# Patient Record
Sex: MALE | Race: WHITE
[De-identification: names, ages, dates, MRNs, and addresses within clinical notes are randomized per-mention and may not be internally consistent; named-entity substitution may affect disease eponyms.]

---

## 2019-08-16 ENCOUNTER — HOSPITAL ENCOUNTER (EMERGENCY)
Dept: HOSPITAL 62 - ER | Age: 54
Discharge: HOME | End: 2019-08-16
Payer: COMMERCIAL

## 2019-08-16 VITALS — DIASTOLIC BLOOD PRESSURE: 85 MMHG | SYSTOLIC BLOOD PRESSURE: 114 MMHG

## 2019-08-16 DIAGNOSIS — R61: ICD-10-CM

## 2019-08-16 DIAGNOSIS — R10.13: ICD-10-CM

## 2019-08-16 DIAGNOSIS — R07.89: Primary | ICD-10-CM

## 2019-08-16 DIAGNOSIS — F17.210: ICD-10-CM

## 2019-08-16 DIAGNOSIS — E78.00: ICD-10-CM

## 2019-08-16 DIAGNOSIS — R11.0: ICD-10-CM

## 2019-08-16 DIAGNOSIS — E78.5: ICD-10-CM

## 2019-08-16 LAB
ADD MANUAL DIFF: NO
ALBUMIN SERPL-MCNC: 4.9 G/DL (ref 3.5–5)
ALP SERPL-CCNC: 51 U/L (ref 38–126)
ANION GAP SERPL CALC-SCNC: 12 MMOL/L (ref 5–19)
AST SERPL-CCNC: 26 U/L (ref 17–59)
BASOPHILS # BLD AUTO: 0 10^3/UL (ref 0–0.2)
BASOPHILS NFR BLD AUTO: 0.3 % (ref 0–2)
BILIRUB DIRECT SERPL-MCNC: 0.2 MG/DL (ref 0–0.4)
BILIRUB SERPL-MCNC: 1.4 MG/DL (ref 0.2–1.3)
BUN SERPL-MCNC: 13 MG/DL (ref 7–20)
CALCIUM: 10 MG/DL (ref 8.4–10.2)
CHLORIDE SERPL-SCNC: 104 MMOL/L (ref 98–107)
CO2 SERPL-SCNC: 24 MMOL/L (ref 22–30)
EOSINOPHIL # BLD AUTO: 0.2 10^3/UL (ref 0–0.6)
EOSINOPHIL NFR BLD AUTO: 4.2 % (ref 0–6)
ERYTHROCYTE [DISTWIDTH] IN BLOOD BY AUTOMATED COUNT: 13 % (ref 11.5–14)
GLUCOSE SERPL-MCNC: 103 MG/DL (ref 75–110)
HCT VFR BLD CALC: 43.6 % (ref 37.9–51)
HGB BLD-MCNC: 15.6 G/DL (ref 13.5–17)
LYMPHOCYTES # BLD AUTO: 1.6 10^3/UL (ref 0.5–4.7)
LYMPHOCYTES NFR BLD AUTO: 26.6 % (ref 13–45)
MCH RBC QN AUTO: 32.2 PG (ref 27–33.4)
MCHC RBC AUTO-ENTMCNC: 35.7 G/DL (ref 32–36)
MCV RBC AUTO: 90 FL (ref 80–97)
MONOCYTES # BLD AUTO: 0.5 10^3/UL (ref 0.1–1.4)
MONOCYTES NFR BLD AUTO: 8.4 % (ref 3–13)
NEUTROPHILS # BLD AUTO: 3.6 10^3/UL (ref 1.7–8.2)
NEUTS SEG NFR BLD AUTO: 60.5 % (ref 42–78)
PLATELET # BLD: 206 10^3/UL (ref 150–450)
POTASSIUM SERPL-SCNC: 4.2 MMOL/L (ref 3.6–5)
PROT SERPL-MCNC: 7.6 G/DL (ref 6.3–8.2)
RBC # BLD AUTO: 4.84 10^6/UL (ref 4.35–5.55)
TOTAL CELLS COUNTED % (AUTO): 100 %
WBC # BLD AUTO: 6 10^3/UL (ref 4–10.5)

## 2019-08-16 PROCEDURE — 84484 ASSAY OF TROPONIN QUANT: CPT

## 2019-08-16 PROCEDURE — 83690 ASSAY OF LIPASE: CPT

## 2019-08-16 PROCEDURE — 85025 COMPLETE CBC W/AUTO DIFF WBC: CPT

## 2019-08-16 PROCEDURE — 80053 COMPREHEN METABOLIC PANEL: CPT

## 2019-08-16 PROCEDURE — 93010 ELECTROCARDIOGRAM REPORT: CPT

## 2019-08-16 PROCEDURE — 36415 COLL VENOUS BLD VENIPUNCTURE: CPT

## 2019-08-16 PROCEDURE — 71046 X-RAY EXAM CHEST 2 VIEWS: CPT

## 2019-08-16 PROCEDURE — 93005 ELECTROCARDIOGRAM TRACING: CPT

## 2019-08-16 PROCEDURE — 99285 EMERGENCY DEPT VISIT HI MDM: CPT

## 2019-08-16 NOTE — RADIOLOGY REPORT (SQ)
EXAM DESCRIPTION:  CHEST 2 VIEWS



COMPLETED DATE/TIME:  8/16/2019 12:46 pm



REASON FOR STUDY:  cp, epig pain



COMPARISON:  None.



EXAM PARAMETERS:  NUMBER OF VIEWS: two views

TECHNIQUE: Digital Frontal and Lateral radiographic views of the chest acquired.

RADIATION DOSE: NA

LIMITATIONS: none



FINDINGS:  LUNGS AND PLEURA: No opacities, masses or pneumothorax. No pleural effusion.

MEDIASTINUM AND HILAR STRUCTURES: No masses or contour abnormalities.

HEART AND VASCULAR STRUCTURES: Heart normal size.  No evidence for failure.

BONES: No acute findings.

HARDWARE: None in the chest.

OTHER: No other significant finding.



IMPRESSION:  NO ACUTE RADIOGRAPHIC FINDING IN THE CHEST.



TECHNICAL DOCUMENTATION:  JOB ID:  [id number]

 2011 [employer]- All Rights Reserved



Reading location - IP/workstation name: [email or web address]

## 2019-08-16 NOTE — PDOC CONSULTATION
Consultation


Consult Date: 08/16/19


Provider Consulted: WAI VOSS


Consult reason:: Atypical chest pain





History of Present Illness


Admission Date/PCP: 


  





  KATIE MARTIN NP





Patient complains of: atypical chest pain


History of Present Illness: 


JULIANA AVINA is a 53 year old male with a past medical history significant for

hyperlipidemia, and daily cigar use who presented to the emergency department 

today with a complaint of atypical chest pain.  The patient reports that he had 

sudden onset of epigastric abdominal discomfort, described as tightness, 

radiating to his back and associated with belching that began while at rest 

yesterday evening.  He reports that the pain lasted approximately 30 minutes and

began to wendy after taking an over-the-counter antacid medication.  Patient 

reports that he slept well overnight without additional discomfort but while at 

work today symptoms reoccurred.  Patient states that he was, again, sitting and 

at rest, though under emotional stress at work and the symptoms reoccurred.  He 

reports that the pain was exactly the same as the previous night, epigastric, 

occasionally radiates to his back, and associated with belching.  The patient 

does endorse anxiety with his discomfort; states that he thought today he may 

have been having a "panic attack."  Patient reports that he immediately 

presented to the emergency department due to his discomfort.  He states that the

pain resolved following treatment with Nitroglycerin SL tab and a GI cocktail.


He is currently pain free and interested in discharge to home.





Past Medical History


Cardiac Medical History: Reports: Hyperlipidema


   Denies: Coronary Artery Disease, Myocardial Infarction, Hypertension


Pulmonary Medical History: Reports: None


EENT Medical History: Reports: None


Neurological Medical History: Reports: None


Endocrine Medical History: Reports: None


Renal/ Medical History: Reports: None


Malignancy Medical History: Reports: None


GI Medical History: Reports: Gastroesophageal Reflux Disease


Musculoskeltal Medical History: Reports: None


Skin Medical History: Reports: None


Psychiatric Medical History: Reports: Tobacco Dependency


Traumatic Medical History: Reports: None


Hematology: Reports: None


Infectious Medical History: Reports: None





Past Surgical History


Past Surgical History: Reports: Tonsillectomy





Social History


Information Source: Patient


Lives with: Family


Smoking Status: Current Every Day Smoker


Cigars Per Day: 1


Frequency of Alcohol Use: Rare


Hx Recreational Drug Use: No


Drugs: None


Hx Prescription Drug Abuse: No





- Advance Directive


Resuscitation Status: Full Code





Family History


Family History: DM, Hypertension


Parental Family History Reviewed: Yes


Children Family History Reviewed: Yes


Sibling(s) Family History Reviewed.: Yes





Medication/Allergy


Allergies/Adverse Reactions: 


                                        





No Known Allergies Allergy (Unverified 08/16/19 11:42)


   











Review of Systems


Constitutional: ABSENT: chills, fever(s), headache(s), weight gain, weight loss


Eyes: ABSENT: visual disturbances


Ears: ABSENT: hearing changes


Cardiovascular: PRESENT: as per HPI


Respiratory: ABSENT: cough, hemoptysis


Gastrointestinal: PRESENT: as per HPI, abdominal pain, heartburn.  ABSENT: 

constipation, diarrhea, hematemesis, hematochezia, nausea, vomiting


Genitourinary: ABSENT: dysuria, hematuria


Musculoskeletal: ABSENT: joint swelling


Integumentary: ABSENT: rash, wounds


Neurological: ABSENT: abnormal gait, abnormal speech, confusion, dizziness, 

focal weakness, syncope


Psychiatric: ABSENT: anxiety, depression, homidical ideation, suicidal ideation


Endocrine: ABSENT: cold intolerance, heat intolerance, polydipsia, polyuria


Hematologic/Lymphatic: ABSENT: easy bleeding, easy bruising





Physical Exam


Vital Signs: 


                                        











Temp Pulse Resp BP Pulse Ox


 


 97.8 F   72   16   148/83 H  98 


 


 08/16/19 11:50  08/16/19 11:50  08/16/19 11:50  08/16/19 11:50  08/16/19 11:50








                                 Intake & Output











 08/15/19 08/16/19 08/17/19





 06:59 06:59 06:59


 


Weight   77.4 kg











General appearance: PRESENT: no acute distress, cooperative, well-developed, 

well-nourished


Head exam: PRESENT: atraumatic, normocephalic


Eye exam: PRESENT: conjunctiva pink, EOMI, PERRLA.  ABSENT: scleral icterus


Ear exam: PRESENT: normal external ear exam


Mouth exam: PRESENT: moist, tongue midline


Neck exam: ABSENT: carotid bruit, JVD, lymphadenopathy, thyromegaly


Respiratory exam: PRESENT: clear to auscultation hilary, symmetrical, unlabored.  

ABSENT: rales, rhonchi, wheezes


Cardiovascular exam: PRESENT: RRR, +S1, +S2.  ABSENT: diastolic murmur, rubs, 

systolic murmur


Pulses: PRESENT: normal dorsalis pedis pul


Vascular exam: PRESENT: normal capillary refill


GI/Abdominal exam: PRESENT: normal bowel sounds, soft.  ABSENT: distended, 

guarding, mass, organolmegaly, rebound, tenderness


Rectal exam: PRESENT: deferred


Extremities exam: PRESENT: full ROM.  ABSENT: calf tenderness, clubbing, pedal 

edema


Neurological exam: PRESENT: alert, awake, oriented to person, oriented to place,

oriented to time, oriented to situation, CN II-XII grossly intact.  ABSENT: 

motor sensory deficit


Psychiatric exam: PRESENT: appropriate affect, normal mood.  ABSENT: homicidal 

ideation, suicidal ideation


Skin exam: PRESENT: dry, intact, warm.  ABSENT: cyanosis, rash





Results


Laboratory Results: 


                                        





                                 08/16/19 12:25 





                                 08/16/19 12:25 





                                        











  08/16/19 08/16/19





  12:25 12:25


 


WBC  6.0 


 


RBC  4.84 


 


Hgb  15.6 


 


Hct  43.6 


 


MCV  90 


 


MCH  32.2 


 


MCHC  35.7 


 


RDW  13.0 


 


Plt Count  206 


 


Seg Neutrophils %  60.5 


 


Lymphocytes %  26.6 


 


Monocytes %  8.4 


 


Eosinophils %  4.2 


 


Basophils %  0.3 


 


Absolute Neutrophils  3.6 


 


Absolute Lymphocytes  1.6 


 


Absolute Monocytes  0.5 


 


Absolute Eosinophils  0.2 


 


Absolute Basophils  0.0 


 


Sodium   140.3


 


Potassium   4.2


 


Chloride   104


 


Carbon Dioxide   24


 


Anion Gap   12


 


BUN   13


 


Creatinine   0.87


 


Est GFR ( Amer)   > 60


 


Est GFR (Non-Af Amer)   > 60


 


Glucose   103


 


Calcium   10.0


 


Total Bilirubin   1.4 H


 


AST   26


 


Alkaline Phosphatase   51


 


Total Protein   7.6


 


Albumin   4.9


 


Lipase   56.6








                                        











  08/16/19 08/16/19





  12:25 15:15


 


Troponin I  < 0.012  < 0.012











Impressions: 


                                        





Chest X-Ray  08/16/19 11:58


IMPRESSION:  NO ACUTE RADIOGRAPHIC FINDING IN THE CHEST.


 














Assessment and Plan





- Diagnosis


(1) Atypical chest pain


Is this a current diagnosis for this admission?: Yes   


Plan: 


Patient presented to the emergency department with atypical chest discomfort 

described as epigastric tightness, occasionally radiating to his back, 

associated with dyspepsia and belching that was relieved by OTC antacid 

medication.


Chest x-ray is benign.


EKG demonstrates normal sinus rhythm without ischemic changes.


Troponins are negative x2.


Heart score of 3 (Age, HLD, Tobacco, Family Hx).





Discussed with patient that chest pain rule out procedure includes obtaining 3 

negative troponins followed by stress testing to determine need for cardiac 

catheterization.  Patient was advised that stress testing is not available at 

our facility until Monday and that patient's similar to him with a low heart 

scores and two negative troponins are typically discharged to home with 

cardiology follow-up to schedule outpatient stress testing.


The patient understood and was satisfied with this recommendation.


The patient was encouraged to return to the emergency department as needed for 

recurrent chest pain, especially if worsened, persistent, increased by activity 

or with associated symptoms of shortness of breath, diaphoresis, nausea, etc.  H

e was assured that he would not be treated poorly of if he re-presented for 

concerning chest pain symptoms.  





Recommend discharge to home with referral to cardiology for outpatient stress 

testing.








(2) Epigastric abdominal pain


Is this a current diagnosis for this admission?: Yes   


Plan: 


Patient endorses epigastric abdominal discomfort, described as tightness, 

occasionally radiating to his back and associated with dyspepsia and belching.  

Patient reports that his symptoms resolved last night after taking an 

over-the-counter antacid and again today after presenting to the emergency 

department when he was provided both a sublingual nitroglycerin tab and GI 

cocktail.


He denies history of gallstones or pancreatitis.


Denies frequent alcohol use.


WBCs are normal and abdomen is benign on exam.


Total bilirubin is elevated to 1.4 but all other LFTs are normal.


Lipase 56.6.





Would recommend considering abdominal ultrasound to evaluate for gallbladder 

disease as potential cause of the patient's epigastric and atypical chest disc

[place].








(3) Hyperlipidemia


Is this a current diagnosis for this admission?: Yes   


Plan: 


Continue home dose statin therapy.








(4) Tobacco dependence


Is this a current diagnosis for this admission?: Yes   


Plan: 


Smoking cessation encouraged.








- Time


Time Spent with patient: 35 or more minutes

## 2019-08-16 NOTE — ER DOCUMENT REPORT
ED Medical Screen (RME)





- General


Chief Complaint: Chest Pain


Stated Complaint: CHEST PAIN


Time Seen by Provider: 08/16/19 11:55


Mode of Arrival: Ambulatory


Information source: Patient


Notes: 





Patient presents complaining of chest and epigastric pain that started yesterday

evening around 10 PM.  Patient complains of occasional difficulty breathing as 

well as some belching.  Patient denies any nausea or vomiting.  Patient does 

report a history of high cholesterol.





I have greeted and performed a rapid initial assessment of this patient.  A 

comprehensive ED assessment and evaluation of the patient, analysis of test 

results and completion of the medical decision making process will be conducted 

by additional ED providers.





- Related Data


Allergies/Adverse Reactions: 


                                        





No Known Allergies Allergy (Unverified 08/16/19 11:42)


   











Physical Exam





- Vital signs


Vitals: 





                                        











Temp Pulse Resp BP Pulse Ox


 


 97.8 F   72   16   148/83 H  98 


 


 08/16/19 11:50  08/16/19 11:50  08/16/19 11:50  08/16/19 11:50  08/16/19 11:50














- General


Notes: 





Epigastric tenderness with palpation





- Cardiovascular


Rhythm: Regular


Heart sounds: S1 appreciated, S2 appreciated





Course





- Vital Signs


Vital signs: 





                                        











Temp Pulse Resp BP Pulse Ox


 


 97.8 F   72   16   148/83 H  98 


 


 08/16/19 11:50  08/16/19 11:50  08/16/19 11:50  08/16/19 11:50  08/16/19 11:50

## 2019-08-17 NOTE — EKG REPORT
SEVERITY:- BORDERLINE ECG -

SINUS RHYTHM

BORDERLINE T ABNORMALITIES, ANT-LAT LEADS

:

Confirmed by: Tara Alexandre 17-Aug-2019 10:18:31

## 2019-08-17 NOTE — ER DOCUMENT REPORT
Entered by KHOI WHITLOCK SCRIBE  08/16/19 0563 





Acting as scribe for:SHERRIE MUNOZ DO





ED Cardiac





- General


Chief Complaint: Chest Pain


Stated Complaint: CHEST PAIN


Time Seen by Provider: 08/16/19 11:55


Primary Care Provider: 


KATIE MARTIN NP [Primary Care Provider] - Follow up tomorrow


ZENON CHAPMAN MD [ACTIVE STAFF] - Follow up tomorrow (Please call the 

cardiologist at his office or [phone])


Mode of Arrival: Ambulatory


Information source: Patient


Notes: 





Patient is a 53-year-old male with hyperlipidemia that presents to the emergency

department today for complaints of chest pain described as "pressure" which 

began yesterday at 2200.  Patient states that he was getting ready to go to bed 

when this pain began last night.  Patient states that he was sitting down in his

recliner, he began feeling diaphoretic, and then this pressure started shortly 

after.  Patient states the pressure eventually subsided and he got to sleep last

night.  Patient states when he woke up this morning he felt seemingly normal so 

he went about his day as he normally would.  Patient states about 30 minutes 

after getting moving this morning he developed this same chest pressure again.  

Patient states he went to work and his chest pain became much worse after 

getting to work.  Patient states that he feels like the aspirin and 

nitroglycerin he has received seemed to relieve his symptoms somewhat but they 

have since come back.  Patient does mention dyspnea on exertion.  Patient states

he has a positive family history of cardiac disease including his grandfather 

and father both having MIs while in their 50s.  Patient denies any nausea or 

recent cardiac stress test.





- Related Data


Allergies/Adverse Reactions: 


                                        





No Known Allergies Allergy (Unverified 08/16/19 11:42)


   











Past Medical History





- General


Information source: Patient





- Social History


Smoking Status: Current Every Day Smoker


Cigarette use (# per day): Yes


Frequency of alcohol use: None


Drug Abuse: None


Lives with: Family


Family History: CAD, Hyperlipidemia


Patient has suicidal ideation: No


Patient has homicidal ideation: No





- Past Medical History


Cardiac Medical History: Reports: Hx Hypercholesterolemia





Review of Systems





- Review of Systems


Constitutional: See HPI, Diaphoresis


EENT: No symptoms reported


Cardiovascular: See HPI, Chest pain


Respiratory: See HPI, Short of breath


Gastrointestinal: denies: Nausea


Genitourinary: No symptoms reported


Male Genitourinary: No symptoms reported


Musculoskeletal: No symptoms reported


Skin: No symptoms reported


Hematologic/Lymphatic: No symptoms reported


Neurological/Psychological: No symptoms reported


-: Yes All other systems reviewed and negative





Physical Exam





- Vital signs


Vitals: 


                                        











Temp Pulse Resp BP Pulse Ox


 


 97.8 F   72   16   148/83 H  98 


 


 08/16/19 11:50  08/16/19 11:50  08/16/19 11:50  08/16/19 11:50  08/16/19 11:50











Interpretation: Hypertensive





- General


General appearance: Appears well, Alert





- HEENT


Head: Normocephalic, Atraumatic


Eyes: Normal


Pupils: PERRL





- Respiratory


Respiratory status: No respiratory distress


Chest status: Nontender


Breath sounds: Normal


Chest palpation: Normal





- Cardiovascular


Rhythm: Regular


Heart sounds: Normal auscultation


Murmur: No





- Abdominal


Inspection: Normal


Distension: No distension


Bowel sounds: Normal


Tenderness: Nontender


Organomegaly: No organomegaly





- Back


Back: Normal, Nontender





- Extremities


General upper extremity: Normal inspection, Nontender, Normal color, Normal ROM,

Normal temperature


General lower extremity: Normal inspection, Nontender, Normal color, Normal ROM,

Normal temperature, Normal weight bearing.  No: Chi's sign





- Neurological


Neuro grossly intact: Yes


Cognition: Normal


Orientation: AAOx4


[place] Coma Scale Eye Opening: Spontaneous


[place] Coma Scale Verbal: Oriented


Aron Coma Scale Motor: Obeys Commands


[place]ron Coma Scale Total: 15


Speech: Normal


Motor strength normal: LUE, RUE, LLE, RLE


Sensory: Normal





- Psychological


Associated symptoms: Normal affect, Normal mood





- Skin


Skin Temperature: Warm


Skin Moisture: Dry


Skin Color: Normal





Course





- Re-evaluation


Re-evalutation: 





08/16/19 





15:00


Dr. Saenz contacted about this patient.  Discussed patient's heart score of 5.  

Does not recommend transfer at this time.  Agrees with admission to telemetry 

observation.





15:30


Discussed with hospitalist.  Believes patient can go home.








Patient is a 53-year-old male with a history of hyperlipidemia and smoking as 

well as a positive family history who comes in today complaining of chest pain 

while at rest with associated diaphoresis.  Discussed with cardiology who 

initially recommended keeping the patient for observation but did not feel that 

he needed to be transferred.  Patient was still having pain in the emergency 

department so nitroglycerin patch was applied.  Patient's symptoms resolved with

nitroglycerin.  He received aspirin.  Hospitalist service was contacted and due 

to lack of stress test available over the weekend, they prefer to send the 

patient home and have him follow-up with cardiology on Monday.  Patient has had 

2 EKGs with no acute changes.  Troponin is negative x2 and has been at least 4 

hours from onset of symptoms.  I discussed this patient at length with the 

hospitalist, cardiologist, patient and his family.  He has a  HEART score of 5, 

and I am concerned about him going home.  Patient is agreeable to going home and

assures me that he will call the cardiologist for follow-up on Monday.  Spoke 

again with Dr. Saenz who is agreeable to this plan.  Stable for discharge.  

Patient wife has also been contacted.  He is to return immediately if he has any

worsening concerns or symptoms.  Understands and agrees with this plan.








- Vital Signs


Vital signs: 


                                        











Temp Pulse Resp BP Pulse Ox


 


 97.8 F   72   17   114/85   98 


 


 08/16/19 11:50  08/16/19 11:50  08/16/19 18:29  08/16/19 18:29  08/16/19 18:29














- Laboratory


Result Diagrams: 


                                 08/16/19 12:25





                                 08/16/19 12:25


Laboratory results interpreted by me: 


                                        











  08/16/19





  12:25


 


Total Bilirubin  1.4 H














- Diagnostic Test


Radiology reviewed: Reports reviewed





- EKG Interpretation by Me


EKG shows normal: Sinus rhythm


Rate: Normal


Rhythm: NSR


When compared to previous EKG there are: No significant change





Discharge





- Discharge


Clinical Impression: 


Chest pain


Qualifiers:


 Chest pain type: unspecified Qualified Code(s): R07.9 - Chest pain, unspecified





Condition: Stable


Disposition: HOME, SELF-CARE


Instructions:  Chest Pain of Unclear Cause ([facility])


Additional Instructions: 


Your symptoms are still concerning for heart disease.  Please call 911 and 

return to the emergency department immediately if you experience chest pain.  

Please call Monday to schedule an outpatient stress test.


Forms:  Smoking Cessation Education, Return to Work


Referrals: 


KATIE MARTIN NP [Primary Care Provider] - Follow up tomorrow


ZENON CHAPMAN MD [ACTIVE STAFF] - Follow up tomorrow (Please call the 

cardiologist at his office or [phone])


Scribe Attestation: 





08/17/19 16:58


I personally performed the services described in the documentation, reviewed and

edited the documentation which was dictated to the scribe in my presence, and it

accurately records my words and actions.





I personally performed the services described in the documentation, reviewed and

edited the documentation which was dictated to the scribe in my presence, and it

accurately records my words and actions.